# Patient Record
(demographics unavailable — no encounter records)

---

## 2018-01-19 PROBLEM — R87.810 CERVICAL HIGH RISK HUMAN PAPILLOMAVIRUS (HPV) DNA TEST POSITIVE: Status: ACTIVE | Noted: 2018-01-19

## 2018-07-20 PROCEDURE — 87624 HPV HI-RISK TYP POOLED RSLT: CPT | Performed by: OBSTETRICS & GYNECOLOGY

## 2024-01-18 NOTE — TELEPHONE ENCOUNTER
LOV 05/18/23  Last well adult 03/09/23    Last refill on ?, for #?, with ? refills  Herpes Agent Protocol [id number]/18/2024 02:38 PM    Appointment in the past 12 or next 3 months     No future appointments.    Order(s) pending, please review. Thank you.

## 2024-01-19 RX ORDER — VALACYCLOVIR HYDROCHLORIDE 500 MG/1
500 TABLET, FILM COATED ORAL 2 TIMES DAILY
Qty: 30 TABLET | Refills: 0 | Status: SHIPPED | OUTPATIENT
Start: 2024-01-19

## 2024-01-19 NOTE — TELEPHONE ENCOUNTER
Please have patient schedule a wellness visit in the second week of March.  We can do blood work at the time of the visit if she is fasting.  Thank you

## 2024-11-05 RX ORDER — VALACYCLOVIR HYDROCHLORIDE 500 MG/1
500 TABLET, FILM COATED ORAL 2 TIMES DAILY
Qty: 30 TABLET | Refills: 0 | OUTPATIENT
Start: 2024-11-05

## 2024-11-05 NOTE — TELEPHONE ENCOUNTER
Valacyclovir Hydrochloride 500 Mg Tab Nort     Pt failed refill protocol for the following reasons:  Herpes Agent Protocol [id number]/05/2024 08:12 AM   Protocol Details In person appointment or virtual visit in the past 12 mos or appointment in next 3 mos   Last refill: 1/19/2024  Last Px: 3/9/2023  Last appt: 5/18/2023  Next appt: N/A    Forward to Dr. Madelyn White,, please advise on refills. Thank you.

## 2024-11-11 RX ORDER — VALACYCLOVIR HYDROCHLORIDE 500 MG/1
500 TABLET, FILM COATED ORAL 2 TIMES DAILY
Qty: 30 TABLET | Refills: 0 | Status: SHIPPED | OUTPATIENT
Start: 2024-11-11

## 2024-11-11 NOTE — TELEPHONE ENCOUNTER
Valacyclovir Hydrochloride 500 Mg Tab Nort     Pt failed refill protocol for the following reasons:  Herpes Agent Protocol [id number]/11/2024 01:46 PM   Protocol Details In person appointment or virtual visit in the past 12 mos or appointment in next 3 mos   Last refill: 1/19/2024, for #30, 0 refill  Last appt: 5/18/2023  Last Px:3/9/2023  Next appt: N/A    Forward to Dr. Madelyn White, please advise on refills. Thank you.